# Patient Record
Sex: FEMALE | URBAN - METROPOLITAN AREA
[De-identification: names, ages, dates, MRNs, and addresses within clinical notes are randomized per-mention and may not be internally consistent; named-entity substitution may affect disease eponyms.]

---

## 2022-05-19 ENCOUNTER — OFFICE VISIT (OUTPATIENT)
Dept: URBAN - METROPOLITAN AREA CLINIC 18 | Facility: CLINIC | Age: 82
End: 2022-05-19
Payer: MEDICARE

## 2022-05-19 DIAGNOSIS — H43.393 OTHER VITREOUS OPACITIES, BILATERAL: ICD-10-CM

## 2022-05-19 DIAGNOSIS — H16.143 PUNCTATE KERATITIS, BILATERAL: ICD-10-CM

## 2022-05-19 DIAGNOSIS — H25.13 AGE-RELATED NUCLEAR CATARACT, BILATERAL: Primary | ICD-10-CM

## 2022-05-19 PROCEDURE — 99203 OFFICE O/P NEW LOW 30 MIN: CPT | Performed by: OPTOMETRIST

## 2022-05-19 ASSESSMENT — INTRAOCULAR PRESSURE
OD: 14
OS: 14

## 2022-05-19 NOTE — IMPRESSION/PLAN
Impression: Age-related nuclear cataract, bilateral: H25.13. Plan: Cataracts account for the patient's complaints. Patient education was discussed regarding cataracts, lens options and surgery. Recommend cataract consult with surgeon. Order A-Scan. Hold off on filling any new glasses prescription until after the consultation. Pt is hesitant about proceeding with Sx - pt will contact us if she wants to proceed.

## 2022-05-19 NOTE — IMPRESSION/PLAN
Impression: Punctate keratitis, bilateral: H16.143. Plan: Discussed diagnosis with patient. Recommend artificial tears in OU for comfort, 3-4x's a day.